# Patient Record
Sex: FEMALE | Race: ASIAN | URBAN - METROPOLITAN AREA
[De-identification: names, ages, dates, MRNs, and addresses within clinical notes are randomized per-mention and may not be internally consistent; named-entity substitution may affect disease eponyms.]

---

## 2023-07-17 ENCOUNTER — LAB OUTSIDE AN ENCOUNTER (OUTPATIENT)
Dept: URBAN - METROPOLITAN AREA CLINIC 19 | Facility: CLINIC | Age: 53
End: 2023-07-17

## 2023-07-17 ENCOUNTER — OFFICE VISIT (OUTPATIENT)
Dept: URBAN - METROPOLITAN AREA CLINIC 19 | Facility: CLINIC | Age: 53
End: 2023-07-17
Payer: SELF-PAY

## 2023-07-17 ENCOUNTER — DASHBOARD ENCOUNTERS (OUTPATIENT)
Age: 53
End: 2023-07-17

## 2023-07-17 ENCOUNTER — WEB ENCOUNTER (OUTPATIENT)
Dept: URBAN - METROPOLITAN AREA CLINIC 19 | Facility: CLINIC | Age: 53
End: 2023-07-17

## 2023-07-17 VITALS
DIASTOLIC BLOOD PRESSURE: 70 MMHG | BODY MASS INDEX: 20.55 KG/M2 | HEIGHT: 64 IN | OXYGEN SATURATION: 99 % | TEMPERATURE: 98.4 F | SYSTOLIC BLOOD PRESSURE: 118 MMHG | HEART RATE: 65 BPM | WEIGHT: 120.4 LBS

## 2023-07-17 DIAGNOSIS — R76.8 HEPATITIS B SURFACE ANTIGEN POSITIVE: ICD-10-CM

## 2023-07-17 PROCEDURE — 99204 OFFICE O/P NEW MOD 45 MIN: CPT | Performed by: INTERNAL MEDICINE

## 2023-07-17 PROCEDURE — 99244 OFF/OP CNSLTJ NEW/EST MOD 40: CPT | Performed by: INTERNAL MEDICINE

## 2023-07-17 NOTE — HPI-TODAY'S VISIT:
Ms. Boateng is a 52-year-old female who presents today for chronic Hepatitis B. Sent upon referral from Dr. Pepe Fernández.  A copy of this report will be sent to the referring provider.   She presents to GI clinic with her  today. She is originally from [place]. Speaks Mandarin. Interpretation services used for visit. They recently got  2 months ago and she moved here to the [place]. Found positive for chlamydia and treated - repeat testing was negative. Lab testing also revealed: Hep B s Ag reactive Hep B core Ab IgM non-reactive Hep C virus non-reactive HIV non-reactive   She reports she has had Hep B immunization in the past many years ago She voices concern that she underwent a full checkup before coming to the [place] and states hepatitis was never mentioned. She reports that she was hospitalized in 2020 after presenting with rectal bleeding. Had a colonoscopy which found some sort of tear then had a repeat colonoscopy not long after and all was normal besides one colon polyp. Denies any family history of liver disease, GI or colon cancer. No GI symptoms. No fever/chills or unintentional weight loss.

## 2023-07-20 LAB
A/G RATIO: 1.6
ALBUMIN: 4.5
ALKALINE PHOSPHATASE: 50
ALT (SGPT): 12
AST (SGOT): 15
BILIRUBIN, TOTAL: 0.4
BUN/CREATININE RATIO: (no result)
BUN: 22
CALCIUM: 9.7
CARBON DIOXIDE, TOTAL: 28
CHLORIDE: 105
CREATININE: 1.03
EGFR: 88
GLOBULIN, TOTAL: 2.8
GLUCOSE: 94
HEMATOCRIT: 37
HEMOGLOBIN: 12.1
HEPATITIS A AB, TOTAL: REACTIVE
HEPATITIS B CORE AB TOTAL: (no result)
HEPATITIS B SURFACE AB IMMUNITY, QN: 944
HEPATITIS B SURFACE ANTIGEN: (no result)
HEPATITIS B VIRUS DNA: NOT DETECTED
HEPATITIS B VIRUS DNA: NOT DETECTED
MCH: 29.4
MCHC: 32.7
MCV: 89.8
MPV: 9.6
PLATELET COUNT: 246
POTASSIUM: 4.4
PROTEIN, TOTAL: 7.3
RDW: 11.6
RED BLOOD CELL COUNT: 4.12
SODIUM: 140
WHITE BLOOD CELL COUNT: 4.6

## 2023-07-24 ENCOUNTER — TELEPHONE ENCOUNTER (OUTPATIENT)
Dept: URBAN - METROPOLITAN AREA CLINIC 19 | Facility: CLINIC | Age: 53
End: 2023-07-24

## 2023-08-10 ENCOUNTER — TELEPHONE ENCOUNTER (OUTPATIENT)
Dept: URBAN - METROPOLITAN AREA CLINIC 19 | Facility: CLINIC | Age: 53
End: 2023-08-10